# Patient Record
Sex: FEMALE | Race: WHITE | Employment: OTHER | URBAN - METROPOLITAN AREA
[De-identification: names, ages, dates, MRNs, and addresses within clinical notes are randomized per-mention and may not be internally consistent; named-entity substitution may affect disease eponyms.]

---

## 2019-02-04 ENCOUNTER — HOSPITAL ENCOUNTER (INPATIENT)
Facility: CLINIC | Age: 69
LOS: 2 days | Discharge: HOME | DRG: 641 | End: 2019-02-06
Attending: EMERGENCY MEDICINE | Admitting: INTERNAL MEDICINE
Payer: COMMERCIAL

## 2019-02-04 DIAGNOSIS — R19.7 DIARRHEA, UNSPECIFIED TYPE: ICD-10-CM

## 2019-02-04 DIAGNOSIS — E87.1 HYPONATREMIA: ICD-10-CM

## 2019-02-04 DIAGNOSIS — E11.9 TYPE 2 DIABETES MELLITUS WITHOUT COMPLICATION, WITHOUT LONG-TERM CURRENT USE OF INSULIN (H): Primary | ICD-10-CM

## 2019-02-04 LAB
ALBUMIN SERPL-MCNC: 3.5 G/DL (ref 3.4–5)
ALBUMIN UR-MCNC: 30 MG/DL
ALP SERPL-CCNC: 71 U/L (ref 40–150)
ALT SERPL W P-5'-P-CCNC: 37 U/L (ref 0–50)
ANION GAP SERPL CALCULATED.3IONS-SCNC: 11 MMOL/L (ref 3–14)
APPEARANCE UR: CLEAR
AST SERPL W P-5'-P-CCNC: 21 U/L (ref 0–45)
BASOPHILS # BLD AUTO: 0 10E9/L (ref 0–0.2)
BASOPHILS NFR BLD AUTO: 0.2 %
BILIRUB SERPL-MCNC: 2.3 MG/DL (ref 0.2–1.3)
BILIRUB UR QL STRIP: NEGATIVE
BUN SERPL-MCNC: 6 MG/DL (ref 7–30)
CALCIUM SERPL-MCNC: 9.1 MG/DL (ref 8.5–10.1)
CHLORIDE SERPL-SCNC: 86 MMOL/L (ref 94–109)
CO2 SERPL-SCNC: 26 MMOL/L (ref 20–32)
COLOR UR AUTO: YELLOW
CREAT SERPL-MCNC: 0.89 MG/DL (ref 0.52–1.04)
DIFFERENTIAL METHOD BLD: ABNORMAL
EOSINOPHIL # BLD AUTO: 0.2 10E9/L (ref 0–0.7)
EOSINOPHIL NFR BLD AUTO: 1.7 %
ERYTHROCYTE [DISTWIDTH] IN BLOOD BY AUTOMATED COUNT: 12.2 % (ref 10–15)
GFR SERPL CREATININE-BSD FRML MDRD: 66 ML/MIN/{1.73_M2}
GLUCOSE SERPL-MCNC: 129 MG/DL (ref 70–99)
GLUCOSE UR STRIP-MCNC: NEGATIVE MG/DL
HCT VFR BLD AUTO: 40.4 % (ref 35–47)
HGB BLD-MCNC: 15 G/DL (ref 11.7–15.7)
HGB UR QL STRIP: NEGATIVE
HYALINE CASTS #/AREA URNS LPF: 4 /LPF (ref 0–2)
IMM GRANULOCYTES # BLD: 0 10E9/L (ref 0–0.4)
IMM GRANULOCYTES NFR BLD: 0.2 %
KETONES UR STRIP-MCNC: 40 MG/DL
LEUKOCYTE ESTERASE UR QL STRIP: NEGATIVE
LYMPHOCYTES # BLD AUTO: 2.1 10E9/L (ref 0.8–5.3)
LYMPHOCYTES NFR BLD AUTO: 22.1 %
MAGNESIUM SERPL-MCNC: 1.6 MG/DL (ref 1.6–2.3)
MCH RBC QN AUTO: 29.4 PG (ref 26.5–33)
MCHC RBC AUTO-ENTMCNC: 37.1 G/DL (ref 31.5–36.5)
MCV RBC AUTO: 79 FL (ref 78–100)
MONOCYTES # BLD AUTO: 0.7 10E9/L (ref 0–1.3)
MONOCYTES NFR BLD AUTO: 7.1 %
MUCOUS THREADS #/AREA URNS LPF: PRESENT /LPF
NEUTROPHILS # BLD AUTO: 6.4 10E9/L (ref 1.6–8.3)
NEUTROPHILS NFR BLD AUTO: 68.7 %
NITRATE UR QL: NEGATIVE
NRBC # BLD AUTO: 0 10*3/UL
NRBC BLD AUTO-RTO: 0 /100
OSMOLALITY SERPL: 266 MMOL/KG (ref 280–301)
OSMOLALITY UR: 426 MMOL/KG (ref 100–1200)
PH UR STRIP: 6 PH (ref 5–7)
PLATELET # BLD AUTO: 363 10E9/L (ref 150–450)
POTASSIUM SERPL-SCNC: 3.1 MMOL/L (ref 3.4–5.3)
POTASSIUM SERPL-SCNC: 3.2 MMOL/L (ref 3.4–5.3)
PROT SERPL-MCNC: 6.9 G/DL (ref 6.8–8.8)
RBC # BLD AUTO: 5.11 10E12/L (ref 3.8–5.2)
RBC #/AREA URNS AUTO: <1 /HPF (ref 0–2)
SODIUM SERPL-SCNC: 123 MMOL/L (ref 133–144)
SODIUM SERPL-SCNC: 127 MMOL/L (ref 133–144)
SODIUM UR-SCNC: 32 MMOL/L
SOURCE: ABNORMAL
SP GR UR STRIP: 1.01 (ref 1–1.03)
SQUAMOUS #/AREA URNS AUTO: 1 /HPF (ref 0–1)
TROPONIN I SERPL-MCNC: <0.015 UG/L (ref 0–0.04)
UROBILINOGEN UR STRIP-MCNC: NORMAL MG/DL (ref 0–2)
WBC # BLD AUTO: 9.4 10E9/L (ref 4–11)
WBC #/AREA URNS AUTO: 1 /HPF (ref 0–5)

## 2019-02-04 PROCEDURE — 81001 URINALYSIS AUTO W/SCOPE: CPT | Performed by: EMERGENCY MEDICINE

## 2019-02-04 PROCEDURE — 25000132 ZZH RX MED GY IP 250 OP 250 PS 637: Performed by: EMERGENCY MEDICINE

## 2019-02-04 PROCEDURE — 83935 ASSAY OF URINE OSMOLALITY: CPT | Performed by: EMERGENCY MEDICINE

## 2019-02-04 PROCEDURE — 99285 EMERGENCY DEPT VISIT HI MDM: CPT | Mod: 25

## 2019-02-04 PROCEDURE — 83735 ASSAY OF MAGNESIUM: CPT | Performed by: EMERGENCY MEDICINE

## 2019-02-04 PROCEDURE — 80053 COMPREHEN METABOLIC PANEL: CPT | Performed by: EMERGENCY MEDICINE

## 2019-02-04 PROCEDURE — 84295 ASSAY OF SERUM SODIUM: CPT | Performed by: INTERNAL MEDICINE

## 2019-02-04 PROCEDURE — 84484 ASSAY OF TROPONIN QUANT: CPT | Performed by: EMERGENCY MEDICINE

## 2019-02-04 PROCEDURE — 12000000 ZZH R&B MED SURG/OB

## 2019-02-04 PROCEDURE — 25000128 H RX IP 250 OP 636: Performed by: INTERNAL MEDICINE

## 2019-02-04 PROCEDURE — 83930 ASSAY OF BLOOD OSMOLALITY: CPT | Performed by: INTERNAL MEDICINE

## 2019-02-04 PROCEDURE — 84132 ASSAY OF SERUM POTASSIUM: CPT | Performed by: INTERNAL MEDICINE

## 2019-02-04 PROCEDURE — 25000128 H RX IP 250 OP 636: Performed by: EMERGENCY MEDICINE

## 2019-02-04 PROCEDURE — 36415 COLL VENOUS BLD VENIPUNCTURE: CPT | Performed by: INTERNAL MEDICINE

## 2019-02-04 PROCEDURE — 99223 1ST HOSP IP/OBS HIGH 75: CPT | Mod: AI | Performed by: INTERNAL MEDICINE

## 2019-02-04 PROCEDURE — 96360 HYDRATION IV INFUSION INIT: CPT

## 2019-02-04 PROCEDURE — 84300 ASSAY OF URINE SODIUM: CPT | Performed by: EMERGENCY MEDICINE

## 2019-02-04 PROCEDURE — 25000132 ZZH RX MED GY IP 250 OP 250 PS 637: Performed by: INTERNAL MEDICINE

## 2019-02-04 PROCEDURE — 85025 COMPLETE CBC W/AUTO DIFF WBC: CPT | Performed by: EMERGENCY MEDICINE

## 2019-02-04 RX ORDER — POTASSIUM CL/LIDO/0.9 % NACL 10MEQ/0.1L
10 INTRAVENOUS SOLUTION, PIGGYBACK (ML) INTRAVENOUS
Status: DISCONTINUED | OUTPATIENT
Start: 2019-02-04 | End: 2019-02-06 | Stop reason: HOSPADM

## 2019-02-04 RX ORDER — POTASSIUM CHLORIDE 1.5 G/1.58G
20-40 POWDER, FOR SOLUTION ORAL
Status: DISCONTINUED | OUTPATIENT
Start: 2019-02-04 | End: 2019-02-06 | Stop reason: HOSPADM

## 2019-02-04 RX ORDER — SODIUM CHLORIDE 9 MG/ML
INJECTION, SOLUTION INTRAVENOUS CONTINUOUS
Status: ACTIVE | OUTPATIENT
Start: 2019-02-04 | End: 2019-02-05

## 2019-02-04 RX ORDER — LATANOPROST 50 UG/ML
1 SOLUTION/ DROPS OPHTHALMIC AT BEDTIME
COMMUNITY

## 2019-02-04 RX ORDER — MAGNESIUM SULFATE HEPTAHYDRATE 40 MG/ML
4 INJECTION, SOLUTION INTRAVENOUS EVERY 4 HOURS PRN
Status: DISCONTINUED | OUTPATIENT
Start: 2019-02-04 | End: 2019-02-06 | Stop reason: HOSPADM

## 2019-02-04 RX ORDER — NICOTINE POLACRILEX 4 MG
15-30 LOZENGE BUCCAL
Status: DISCONTINUED | OUTPATIENT
Start: 2019-02-04 | End: 2019-02-06 | Stop reason: HOSPADM

## 2019-02-04 RX ORDER — POTASSIUM CHLORIDE 29.8 MG/ML
20 INJECTION INTRAVENOUS
Status: DISCONTINUED | OUTPATIENT
Start: 2019-02-04 | End: 2019-02-06 | Stop reason: HOSPADM

## 2019-02-04 RX ORDER — PROCHLORPERAZINE MALEATE 5 MG
5 TABLET ORAL EVERY 6 HOURS PRN
Status: DISCONTINUED | OUTPATIENT
Start: 2019-02-04 | End: 2019-02-06 | Stop reason: HOSPADM

## 2019-02-04 RX ORDER — ATORVASTATIN CALCIUM 10 MG/1
10 TABLET, FILM COATED ORAL DAILY
COMMUNITY

## 2019-02-04 RX ORDER — DEXTROSE MONOHYDRATE 25 G/50ML
25-50 INJECTION, SOLUTION INTRAVENOUS
Status: DISCONTINUED | OUTPATIENT
Start: 2019-02-04 | End: 2019-02-06 | Stop reason: HOSPADM

## 2019-02-04 RX ORDER — METFORMIN HCL 500 MG
500 TABLET, EXTENDED RELEASE 24 HR ORAL
Status: ON HOLD | COMMUNITY
End: 2019-02-06

## 2019-02-04 RX ORDER — ONDANSETRON 2 MG/ML
4 INJECTION INTRAMUSCULAR; INTRAVENOUS EVERY 6 HOURS PRN
Status: DISCONTINUED | OUTPATIENT
Start: 2019-02-04 | End: 2019-02-06 | Stop reason: HOSPADM

## 2019-02-04 RX ORDER — NAPROXEN SODIUM 220 MG
220 TABLET ORAL 2 TIMES DAILY WITH MEALS
Status: ON HOLD | COMMUNITY
End: 2019-02-06

## 2019-02-04 RX ORDER — NALOXONE HYDROCHLORIDE 0.4 MG/ML
.1-.4 INJECTION, SOLUTION INTRAMUSCULAR; INTRAVENOUS; SUBCUTANEOUS
Status: DISCONTINUED | OUTPATIENT
Start: 2019-02-04 | End: 2019-02-06 | Stop reason: HOSPADM

## 2019-02-04 RX ORDER — POTASSIUM CHLORIDE 7.45 MG/ML
10 INJECTION INTRAVENOUS
Status: DISCONTINUED | OUTPATIENT
Start: 2019-02-04 | End: 2019-02-06 | Stop reason: HOSPADM

## 2019-02-04 RX ORDER — POTASSIUM CHLORIDE 1500 MG/1
40 TABLET, EXTENDED RELEASE ORAL ONCE
Status: COMPLETED | OUTPATIENT
Start: 2019-02-04 | End: 2019-02-04

## 2019-02-04 RX ORDER — POLYETHYLENE GLYCOL 3350 17 G/17G
17 POWDER, FOR SOLUTION ORAL DAILY PRN
Status: DISCONTINUED | OUTPATIENT
Start: 2019-02-04 | End: 2019-02-06 | Stop reason: HOSPADM

## 2019-02-04 RX ORDER — LISINOPRIL AND HYDROCHLOROTHIAZIDE 20; 25 MG/1; MG/1
1 TABLET ORAL DAILY
Status: ON HOLD | COMMUNITY
End: 2019-02-06

## 2019-02-04 RX ORDER — LISINOPRIL 20 MG/1
20 TABLET ORAL DAILY
Status: DISCONTINUED | OUTPATIENT
Start: 2019-02-05 | End: 2019-02-06 | Stop reason: HOSPADM

## 2019-02-04 RX ORDER — ATENOLOL 50 MG/1
50 TABLET ORAL DAILY
Status: DISCONTINUED | OUTPATIENT
Start: 2019-02-04 | End: 2019-02-04 | Stop reason: CLARIF

## 2019-02-04 RX ORDER — PROCHLORPERAZINE 25 MG
12.5 SUPPOSITORY, RECTAL RECTAL EVERY 12 HOURS PRN
Status: DISCONTINUED | OUTPATIENT
Start: 2019-02-04 | End: 2019-02-06 | Stop reason: HOSPADM

## 2019-02-04 RX ORDER — POTASSIUM CHLORIDE 1500 MG/1
20-40 TABLET, EXTENDED RELEASE ORAL
Status: DISCONTINUED | OUTPATIENT
Start: 2019-02-04 | End: 2019-02-06 | Stop reason: HOSPADM

## 2019-02-04 RX ORDER — MULTIVITAMIN,THERAPEUTIC
1 TABLET ORAL DAILY
COMMUNITY

## 2019-02-04 RX ORDER — ONDANSETRON 4 MG/1
4 TABLET, ORALLY DISINTEGRATING ORAL EVERY 8 HOURS PRN
Qty: 10 TABLET | Refills: 0 | Status: SHIPPED | OUTPATIENT
Start: 2019-02-04 | End: 2019-02-04

## 2019-02-04 RX ORDER — LATANOPROST 50 UG/ML
1 SOLUTION/ DROPS OPHTHALMIC AT BEDTIME
Status: DISCONTINUED | OUTPATIENT
Start: 2019-02-04 | End: 2019-02-06 | Stop reason: HOSPADM

## 2019-02-04 RX ORDER — ONDANSETRON 4 MG/1
4 TABLET, ORALLY DISINTEGRATING ORAL EVERY 6 HOURS PRN
Status: DISCONTINUED | OUTPATIENT
Start: 2019-02-04 | End: 2019-02-06 | Stop reason: HOSPADM

## 2019-02-04 RX ADMIN — POTASSIUM CHLORIDE 20 MEQ: 1500 TABLET, EXTENDED RELEASE ORAL at 21:30

## 2019-02-04 RX ADMIN — SODIUM CHLORIDE: 9 INJECTION, SOLUTION INTRAVENOUS at 21:31

## 2019-02-04 RX ADMIN — POTASSIUM CHLORIDE 40 MEQ: 1500 TABLET, EXTENDED RELEASE ORAL at 16:50

## 2019-02-04 RX ADMIN — SODIUM CHLORIDE 1000 ML: 9 INJECTION, SOLUTION INTRAVENOUS at 16:50

## 2019-02-04 ASSESSMENT — ACTIVITIES OF DAILY LIVING (ADL)
FALL_HISTORY_WITHIN_LAST_SIX_MONTHS: NO
BATHING: 0-->INDEPENDENT
SWALLOWING: 0-->SWALLOWS FOODS/LIQUIDS WITHOUT DIFFICULTY
COGNITION: 0 - NO COGNITION ISSUES REPORTED
RETIRED_EATING: 0-->INDEPENDENT
DRESS: 0-->INDEPENDENT
TOILETING: 0-->INDEPENDENT
AMBULATION: 0-->INDEPENDENT
RETIRED_COMMUNICATION: 0-->UNDERSTANDS/COMMUNICATES WITHOUT DIFFICULTY
TRANSFERRING: 0-->INDEPENDENT

## 2019-02-04 ASSESSMENT — MIFFLIN-ST. JEOR: SCORE: 1409.05

## 2019-02-04 ASSESSMENT — ENCOUNTER SYMPTOMS
CONFUSION: 0
CHILLS: 0
ABDOMINAL PAIN: 0
DIARRHEA: 1
FEVER: 0
NAUSEA: 1
BLOOD IN STOOL: 0
VOMITING: 1

## 2019-02-04 NOTE — ED PROVIDER NOTES
History     Chief Complaint:  Diarrhea    HPI   Tessie Dexter is a 68 year old female, with a history of CAD, hypertension, hyperlipidemia, and type 2 diabetes, who presents with her  to the ED for evaluation of diarrhea. The patient reports she was prescribed a Z-Darius a month ago. She developed nonbloody diarrhea over 2 weeks ago. She had up to 5 episodes two days ago and 2 episodes yesterday and today. The stools were initially watery but is becoming more formed. She has been taking Imodium with the last dose yesterday. The patient notes she vomited once over a week ago. She has been feeling intermittently nauseous. She did need to receive fluids 5 days ago. She has been continuing to hydrate with smartwater. The patient reports she visited her PCP at [facility] today and was advised to visit the ED. The patient denies any recent travel, fever, chills, abdominal pain, or urinary symptoms. The  denies confusion.     Laboratory:   CMP: (L), Potassium 3.0(L), Chloride 87(L), Glucose 151(H), BUN 6(L), o/w WNL (Creatinine 0.90) (1/30/19)  Ova/parasite: Negative (1/28/19)  Stool pathogen multiplex PCR: Negative (1/26/19)   C diff PCR: Negative(1/25/19)    Allergies:  Macrobid: rash      Medications:    Metformin  Lisinopril-Hydrochlorothiazide   Lipitor  Multivitamin  Fish oil  Vitamin E  Aspirin 81mg   Atenolol   Vitamin D  Simvastatin   Dyazide    Past Medical History:    Type 2 DM  Glaucoma   HLD  HTN  CAD    Past Surgical History:    Cholecystectomy  D&C    Family History:    Diabetes  Cancer  MI    Social History:  Smoking status: Never smoker    Alcohol use: No  Presents to ED with    Marital Status:  Unknown [6]     Review of Systems   Constitutional: Negative for chills and fever.   Gastrointestinal: Positive for diarrhea, nausea and vomiting. Negative for abdominal pain and blood in stool.   Psychiatric/Behavioral: Negative for confusion.   All other systems reviewed and  are negative.    Physical Exam     Patient Vitals for the past 24 hrs:   BP Temp Temp src Pulse Heart Rate Resp SpO2 Height Weight   02/04/19 2015 128/77 98.4  F (36.9  C) Oral 96 -- 18 96 % -- --   02/04/19 1737 141/83 -- -- 99 -- 18 100 % -- --   02/04/19 1203 131/77 98.8  F (37.1  C) Oral -- 82 20 99 % 1.524 m (5') 95.3 kg (210 lb)     Physical Exam    General: Resting comfortably on the gurney  Eyes:  The pupils are equal and round    Conjunctivae and sclerae are normal  ENT:    Moist mucous membranes  Neck:  Normal range of motion  CV:  Regular rate and rhythm    Skin warm and well perfused   Resp:  Lungs are clear    Non-labored    No rales    No wheezing   GI:  Abdomen is soft, there is no rigidity    No distension    No rebound tenderness     No abdominal tenderness  MS:  Normal muscular tone  Skin:  No rash or acute skin lesions noted  Neuro:   Awake, alert.      No confusion    Speech is normal and fluent.    Face is symmetric.     Moves all extremities equally  Psych: Normal affect.  Appropriate interactions.    Emergency Department Course     Laboratory:  Troponin I (1339): <0.015    CBC: o/w WNL (WBC 9.4, HGB 15.0, )  CMP: (L), Potassium 3.2(L), Chloride 86(L), Glucose 129(H), BUN 6(L), Bilirubin total 2.3(H), o/w WNL (Creatinine 0.89)     UA: Urineketon 40, Protein albumin 30, Mucous present, Hyaline casts 4(H), o/w Negative     Interventions:  1650: NS 1L Bolus IV  1650: Potassium chloride 40mEq PO    Emergency Department Course:  Past medical records, nursing notes, and vitals reviewed.  1623: I performed an exam of the patient and obtained history, as documented above.    IV inserted and blood drawn.    1657: I spoke with Dr. Altamirano, patient's PCP.     1700: I rechecked the patient. Explained plan to patient and .    1804: I spoke to Dr. Abreu of the hospitalist service who accepts the patient for admission.     Findings and plan explained to the Patient and spouse who consents  to admission. Discussed the patient with Dr. Abreu, who will admit the patient to a med bed for further monitoring, evaluation, and treatment.     Impression & Plan      Medical Decision Making:  Tessie Dexter is a 68 year old female who presented to the ED with diarrhea. Seen now several times for diarrhea. No abdominal tenderness on exam so doubt diverticulitis, obstruction, colitis, etc. Stool studies unremarkable last week in clinic. Labs today with worsening hyponatremia. Was Na 125 on 1/30 but today 123. Diarrhea does seem to be slowing down. Hyponatremia likely from diarrhea and volume loss.  Discussed with her PCP over the phone. Will admit for worsening hyponatremia and discussed with hospitalist for admission.    Diagnosis:    ICD-10-CM   1. Diarrhea, unspecified type R19.7   2. Hyponatremia E87.1     Disposition: Patient admitted to a med bed by Dr. Lois Foster  2/4/2019    EMERGENCY DEPARTMENT    Scribe Disclosure:  I, Shana Foster, am serving as a scribe at 4:23 PM on 2/4/2019 to document services personally performed by Sayad Lu MD based on my observations and the provider's statements to me.        Sayda Lu MD  02/05/19 0312

## 2019-02-04 NOTE — ED NOTES
"[facility]  ED Nurse Handoff Report    ED Chief complaint: Diarrhea (x 3 weeks)      ED Diagnosis:   Final diagnoses:   Diarrhea, unspecified type   Hyponatremia       Code Status: Full Code    Allergies:   Allergies   Allergen Reactions     Macrobid [Nitrofurantoin]        Activity level - Baseline/Home:  Independent    Activity Level - Current:   Stand with Assist     Needed?: No    Isolation: Yes  Infection: other, no stool sample collected  Bariatric?: No    Vital Signs:   Vitals:    02/04/19 1203 02/04/19 1737   BP: 131/77 141/83   Pulse:  99   Resp: 20 18   Temp: 98.8  F (37.1  C)    TempSrc: Oral    SpO2: 99% 100%   Weight: 95.3 kg (210 lb)    Height: 1.524 m (5')        Cardiac Rhythm: ,        Pain level: 0-10 Pain Scale: 0    Is this patient confused?: No   Does this patient have a guardian?  No         If yes, is there guardianship documents in the Epic \"Code/ACP\" activity?  N/A         Guardian Notified?  N/A  [place] - Suicide Severity Rating Scale Completed?  Yes  If yes, what color did the patient score?  White    Patient Report: Initial Complaint: Diarrhea x2 weeks   Focused Assessment: Presents to ED today after visiting PCP office today for diarrhea x2 weeks.  These have been non-bloody.  Stools were initially watery but has become more formed over the last few days.  Vomited x1 a week ago but has had on and off nausea.  Reports having fluids 5 days ago.  Has been orally hydrating with Smart Water the last few days.  Was also prescribed a Z-nirav 1 month ago.  Tests Performed: basic blood work and UA  Abnormal Results: see lab reults  Treatments provided: NS 1 L    Family Comments:  left to go home.    OBS brochure/video discussed/provided to patient/family: Yes              Name of person given brochure if not patient: self              Relationship to patient: self    ED Medications:   Medications   potassium chloride ER (K-DUR/KLOR-CON M) CR tablet 40 mEq (40 " mEq Oral Given 2/4/19 1650)   0.9% sodium chloride BOLUS (0 mLs Intravenous Stopped 2/4/19 1739)       Drips infusing?:  No    For the majority of the shift this patient was Green.   Interventions performed were basic cares.    Severe Sepsis OR Septic Shock Diagnosis Present: No    To be done/followed up on inpatient unit:  none    ED NURSE PHONE NUMBER: *[phone]

## 2019-02-05 LAB
ANION GAP SERPL CALCULATED.3IONS-SCNC: 6 MMOL/L (ref 3–14)
BASOPHILS # BLD AUTO: 0 10E9/L (ref 0–0.2)
BASOPHILS NFR BLD AUTO: 0.3 %
BILIRUB DIRECT SERPL-MCNC: 0.4 MG/DL (ref 0–0.2)
BILIRUB SERPL-MCNC: 1.6 MG/DL (ref 0.2–1.3)
BUN SERPL-MCNC: 7 MG/DL (ref 7–30)
C COLI+JEJUNI+LARI FUSA STL QL NAA+PROBE: NOT DETECTED
C DIFF TOX B STL QL: NEGATIVE
CALCIUM SERPL-MCNC: 8.7 MG/DL (ref 8.5–10.1)
CHLORIDE SERPL-SCNC: 101 MMOL/L (ref 94–109)
CO2 SERPL-SCNC: 25 MMOL/L (ref 20–32)
CREAT SERPL-MCNC: 0.86 MG/DL (ref 0.52–1.04)
DIFFERENTIAL METHOD BLD: NORMAL
EC STX1 GENE STL QL NAA+PROBE: NOT DETECTED
EC STX2 GENE STL QL NAA+PROBE: NOT DETECTED
ENTERIC PATHOGEN COMMENT: NORMAL
EOSINOPHIL # BLD AUTO: 0.3 10E9/L (ref 0–0.7)
EOSINOPHIL NFR BLD AUTO: 3.5 %
ERYTHROCYTE [DISTWIDTH] IN BLOOD BY AUTOMATED COUNT: 12.4 % (ref 10–15)
GFR SERPL CREATININE-BSD FRML MDRD: 70 ML/MIN/{1.73_M2}
GLUCOSE BLDC GLUCOMTR-MCNC: 104 MG/DL (ref 70–99)
GLUCOSE BLDC GLUCOMTR-MCNC: 131 MG/DL (ref 70–99)
GLUCOSE BLDC GLUCOMTR-MCNC: 139 MG/DL (ref 70–99)
GLUCOSE BLDC GLUCOMTR-MCNC: 141 MG/DL (ref 70–99)
GLUCOSE BLDC GLUCOMTR-MCNC: 149 MG/DL (ref 70–99)
GLUCOSE BLDC GLUCOMTR-MCNC: 155 MG/DL (ref 70–99)
GLUCOSE SERPL-MCNC: 123 MG/DL (ref 70–99)
HBA1C MFR BLD: 7.3 % (ref 0–5.6)
HCT VFR BLD AUTO: 37.1 % (ref 35–47)
HGB BLD-MCNC: 13.4 G/DL (ref 11.7–15.7)
IMM GRANULOCYTES # BLD: 0 10E9/L (ref 0–0.4)
IMM GRANULOCYTES NFR BLD: 0.3 %
LYMPHOCYTES # BLD AUTO: 2.6 10E9/L (ref 0.8–5.3)
LYMPHOCYTES NFR BLD AUTO: 33.6 %
MCH RBC QN AUTO: 28.7 PG (ref 26.5–33)
MCHC RBC AUTO-ENTMCNC: 36.1 G/DL (ref 31.5–36.5)
MCV RBC AUTO: 79 FL (ref 78–100)
MONOCYTES # BLD AUTO: 0.7 10E9/L (ref 0–1.3)
MONOCYTES NFR BLD AUTO: 9.2 %
NEUTROPHILS # BLD AUTO: 4.1 10E9/L (ref 1.6–8.3)
NEUTROPHILS NFR BLD AUTO: 53.1 %
NOROV GI+II ORF1-ORF2 JNC STL QL NAA+PR: NOT DETECTED
NRBC # BLD AUTO: 0 10*3/UL
NRBC BLD AUTO-RTO: 0 /100
PLATELET # BLD AUTO: 306 10E9/L (ref 150–450)
POTASSIUM SERPL-SCNC: 3.8 MMOL/L (ref 3.4–5.3)
RBC # BLD AUTO: 4.67 10E12/L (ref 3.8–5.2)
RVA NSP5 STL QL NAA+PROBE: NOT DETECTED
SALMONELLA SP RPOD STL QL NAA+PROBE: NOT DETECTED
SHIGELLA SP+EIEC IPAH STL QL NAA+PROBE: NOT DETECTED
SODIUM SERPL-SCNC: 132 MMOL/L (ref 133–144)
SODIUM SERPL-SCNC: 136 MMOL/L (ref 133–144)
SODIUM SERPL-SCNC: 136 MMOL/L (ref 133–144)
SPECIMEN SOURCE: NORMAL
V CHOL+PARA RFBL+TRKH+TNAA STL QL NAA+PR: NOT DETECTED
WBC # BLD AUTO: 7.7 10E9/L (ref 4–11)
Y ENTERO RECN STL QL NAA+PROBE: NOT DETECTED

## 2019-02-05 PROCEDURE — 12000000 ZZH R&B MED SURG/OB

## 2019-02-05 PROCEDURE — 25000128 H RX IP 250 OP 636: Performed by: INTERNAL MEDICINE

## 2019-02-05 PROCEDURE — 85025 COMPLETE CBC W/AUTO DIFF WBC: CPT | Performed by: INTERNAL MEDICINE

## 2019-02-05 PROCEDURE — 83036 HEMOGLOBIN GLYCOSYLATED A1C: CPT | Performed by: INTERNAL MEDICINE

## 2019-02-05 PROCEDURE — 00000146 ZZHCL STATISTIC GLUCOSE BY METER IP

## 2019-02-05 PROCEDURE — 25000132 ZZH RX MED GY IP 250 OP 250 PS 637: Performed by: INTERNAL MEDICINE

## 2019-02-05 PROCEDURE — 99232 SBSQ HOSP IP/OBS MODERATE 35: CPT | Performed by: HOSPITALIST

## 2019-02-05 PROCEDURE — 87493 C DIFF AMPLIFIED PROBE: CPT | Performed by: EMERGENCY MEDICINE

## 2019-02-05 PROCEDURE — 87506 IADNA-DNA/RNA PROBE TQ 6-11: CPT | Performed by: EMERGENCY MEDICINE

## 2019-02-05 PROCEDURE — 84295 ASSAY OF SERUM SODIUM: CPT | Performed by: INTERNAL MEDICINE

## 2019-02-05 PROCEDURE — 82248 BILIRUBIN DIRECT: CPT | Performed by: INTERNAL MEDICINE

## 2019-02-05 PROCEDURE — 36415 COLL VENOUS BLD VENIPUNCTURE: CPT | Performed by: INTERNAL MEDICINE

## 2019-02-05 PROCEDURE — 82247 BILIRUBIN TOTAL: CPT | Performed by: INTERNAL MEDICINE

## 2019-02-05 PROCEDURE — 25000131 ZZH RX MED GY IP 250 OP 636 PS 637: Performed by: INTERNAL MEDICINE

## 2019-02-05 PROCEDURE — 80048 BASIC METABOLIC PNL TOTAL CA: CPT | Performed by: INTERNAL MEDICINE

## 2019-02-05 RX ADMIN — LATANOPROST 1 DROP: 50 SOLUTION OPHTHALMIC at 21:02

## 2019-02-05 RX ADMIN — LISINOPRIL 20 MG: 20 TABLET ORAL at 09:32

## 2019-02-05 RX ADMIN — POTASSIUM CHLORIDE 40 MEQ: 1500 TABLET, EXTENDED RELEASE ORAL at 00:40

## 2019-02-05 RX ADMIN — INSULIN ASPART 1 UNITS: 100 INJECTION, SOLUTION INTRAVENOUS; SUBCUTANEOUS at 13:33

## 2019-02-05 RX ADMIN — INSULIN ASPART 1 UNITS: 100 INJECTION, SOLUTION INTRAVENOUS; SUBCUTANEOUS at 09:33

## 2019-02-05 RX ADMIN — SODIUM CHLORIDE: 9 INJECTION, SOLUTION INTRAVENOUS at 09:38

## 2019-02-05 ASSESSMENT — ACTIVITIES OF DAILY LIVING (ADL)
ADLS_ACUITY_SCORE: 11

## 2019-02-05 ASSESSMENT — MIFFLIN-ST. JEOR: SCORE: 1413.5

## 2019-02-05 NOTE — PROGRESS NOTES
[facility]  Hospitalist Progress Note   02/05/2019          Assessment and Plan:       Ms. Tessie Dexter is a 68-year-old female with a history of diabetes mellitus type 2, hypertension, hyperlipidemia and coronary artery disease admitted on 2/4/2019 with loose stools and diarrhea.    Hyponatremia, suspect hypovolemic/nutritional as well as related to diuretic usage.    Hypokalemia likely from diarrhea/diuretic use.  Having decreased appetite with nausea as well as diarrhea for the past 2 weeks.  She is also on hydrochlorothiazide.   Presented with sodium of 123, potassium 3.2, chloride 86, bicarb 26.  Creatinine 0.89.  Troponin negative, CBC unremarkable, UA did not show signs of infection but did show 40 ketones.   serum osmolality 266, urine sodium 32, urine osmolality 426.  Patient continues to have diarrhea, minimal ambulation and feeling weak.  Sodium improved to 132.  Continue gentle hydration with normal saline at 75 mL/h.  Continue to hold PTA hydrochlorothiazide.    Diarrhea likely viral gastroenteritis.  Ongoing diarrhea for 2 weeks.  No blood.  Evaluation late last month which was negative for C. diff toxin B, stool O and P and with negative stool pathogen panel  C. difficile negative.  Stool for enteric pathogen pending.  Encourage oral probiotic use, gentle hydration as above.    Diabetes mellitus type 2.    Hemoglobin A1c 7.3.  Hold PTA metformin given diarrhea.  High intensity correction scale insulin ordered.  Monitor blood sugars.  Recommend carb controlled diet.    Hyperbilirubinemia.  Bilirubin of 0.3 on admission, check total bilirubin, direct bilirubin levels.  Monitor levels periodically.    Hypertension.  Continue PTA lisinopril.  Hold PTA hydrochlorothiazide.    Hyperlipidemia.  Continue PTA atorvastatin.    Coronary artery disease.  This is on the basis of an equivocal stress test in 2008 with subsequent CT angiogram showing high calcium score, but she was therefore unable to  complete the CT angiogram.  Does not appear to be symptomatic.    Continue PTA aspirin, atorvastatin and lisinopril.    Glaucoma continue PTA eyedrops.    Medically complicated obesity with a BMI of 41.20.  Will need to consider lifestyle modification as able to as outpatient.  Follow-up with PCP.    Arthritis bilateral knees.  PRN Tylenol/warm compress    Physical deconditioning due to acute illness/medically complicated obesity.  Lives at home with her .  Uses a cane or a walker to ambulate around the house.  Lately has been using motorized wheelchair from a family member for ambulation.  Requested patient's RN to have patient ambulate, if any concerns will consider physical therapy assessment in the hospital.  Defer further arthritis workup/eval to PCP.     Orders Placed This Encounter      Low Consistent CHO Diet      DVT Prophylaxis: SCD  Code Status: Full Code  Disposition: Expected discharge tomorrow pending clinical improvement    Patient, interdisciplinary team involved in care and agrees with plan.  Total time  >25 min. More than 50% of time spent in direct patient care, care coordination, patient counseling, and formalizing plan of care.     Juanjose Goodson MD        Interval History:      Patient lying in bed, tolerating clear liquid diet.  Complains of nausea, had one bowel movement overnight, 3 bowel movements since morning, loose watery per patient.No blood in the stools.  Denies any chest pain or shortness of breath.No palpitations.  Mild headache, no dizziness.  No fever or chills.         Physical Exam:        Physical Exam   Temp:  [97.8  F (36.6  C)-98.4  F (36.9  C)] 97.8  F (36.6  C)  Pulse:  [96-99] 96  Heart Rate:  [91] 91  Resp:  [16-18] 16  BP: (128-143)/(77-86) 143/86  SpO2:  [96 %-100 %] 97 %    Intake/Output Summary (Last 24 hours) at 2/5/2019 1331  Last data filed at 2/5/2019 0300  Gross per 24 hour   Intake 100 ml   Output 200 ml   Net -100 ml     Admission Weight: 95.3 kg  (210 lb)  Current Weight: 95.7 kg (210 lb 15.7 oz)    PHYSICAL EXAM  GENERAL: Patient is in no distress. Alert and oriented.  HEART: Regular rate and rhythm. S1S2. No murmurs  LUNGS bilateral slightly decreased breath sounds.  No wheezing no crackles.  ABDOMEN: Soft, no abdominal tenderness, bowel sounds heard   NEURO: No focal weakness, moving all extremities.  EXTREMITIES: Trace pedal edema. 2+ peripheral pulses.  SKIN: Warm, dry. No rash or bruising.  PSYCHIATRY Cooperative       Medications:          insulin aspart  1-3 Units Subcutaneous TID AC     insulin aspart  1-3 Units Subcutaneous At Bedtime     latanoprost  1 drop Both Eyes At Bedtime     lisinopril  20 mg Oral Daily     glucose **OR** dextrose **OR** glucagon, magnesium sulfate, melatonin, naloxone, ondansetron **OR** ondansetron, polyethylene glycol, potassium chloride, potassium chloride with lidocaine, potassium chloride, potassium chloride, potassium chloride, prochlorperazine **OR** prochlorperazine **OR** prochlorperazine         Data:      All new lab and imaging data was reviewed.

## 2019-02-05 NOTE — PLAN OF CARE
A&Ox4, patient has one  watery stool this 12 hour shift. Up to the bathroom with SBA, she calls approprietly. Mod carb diet Skin is intact. She didn't bring home medications. Educated  done with c-diff isolation and handouts give. Monitoring low potassium and sodium. BGM and sliding scale insulin. No insulin needed. She takes metformin at home.

## 2019-02-05 NOTE — PROGRESS NOTES
RECEIVING UNIT ED HANDOFF REVIEW    ED Nurse Handoff Report was reviewed by: Katherine Barry on February 4, 2019 at 7:55 PM

## 2019-02-05 NOTE — H&P
Admitted:     02/04/2019      PRIMARY CARE PHYSICIAN:  Isabelle Altamirano MD.      CHIEF COMPLAINT:  Diarrhea.      HISTORY OF PRESENT ILLNESS:  Ms. Tessie Dexter is a 68-year-old female patient with a history notable including diabetes mellitus type 2, hypertension, coronary artery disease (based on cardiac CT), who presents with the above issues.  The patient has been dealing with loose stools/diarrhea for the past 2 weeks up to 5 times a day, nonbloody.  Over the past few days it has been getting better and she has been only having about 2 per day.  The patient did have low sodium in clinic about 5 days ago down to 125.  She has been drinking Smart Water for hydration.  She has had nausea, but no vomiting.  She has been able keep most food down.  She was seen in clinic today and given concerns of a provider there, she was sent to [facility] for further evaluation.      The patient was evaluated.  Vital signs showed a temperature of 98.8, heart rate 82, blood pressure 131/77, respiration 20, O2 saturation 99%.  Labs showed that her sodium level was down to 123.  Creatinine was normal.  CBC was unremarkable.  Urinalysis did not suggest infection and it did show 40 ketones.  Overall, given these findings, request for admission was made.      The patient denies any chest pain.  No shortness breath.  No abdominal pain or bloody stools.  No fevers or chills.  Of note, her primary clinic apparently did send some stool tests including C. difficile and PCR on the 01/25, which was negative.  Also, stool ova and parasites was also negative.  She had a stool pathogen PCR which was negative.      PAST MEDICAL HISTORY:   1.  Diabetes mellitus type 2.  Hemoglobin A1c 7.0 in 12/2018.   2.  Hypertension.   3.  Hyperlipidemia.   4.  Coronary artery disease.  She had a nuclear stress test 12/2008 which showed evidence of breast attenuation with an area of reversible defect seen in the apical setting without associated wall motion  abnormality and noted a perfusion deficit seen at both rest and stress, but more prominent in stress, but overall, given the breast attenuation could not be conclusive as far as being a true reversible defect.  She went on to have a CT angiogram in 10/2008, which was unable to be completed due to high calcium score of 1844.   5.  Glaucoma.      PAST SURGICAL HISTORY:   1.  Status post cholecystectomy.   2.  Status post D and C.      ALLERGIES:  MACROBID.      HOME MEDICATIONS:  Based on Care Everywhere:   1.  Aspirin 81 mg a day.   2.  Vitamin E.    3.  Omega 3 fatty acids.   4.  Multivitamins.   5.  Xalatan eye drops.   6.  Atorvastatin 10 mg daily.   7.  Lisinopril/hydrochlorothiazide 20/25 mg 1 tablet a day.   8.  Metformin XR 2000 mg a day.      SOCIAL HISTORY:  The patient does not smoke or drink.  She is .  Does not have any children.  Formerly worked in [employer] and now retired.      FAMILY HISTORY:  Reviewed and there is a positive history of heart disease in the patient's father who  of a heart attack at age 45.      REVIEW OF SYSTEMS:  As noted in HPI, otherwise 10-point review of systems negative.      PHYSICAL EXAMINATION:   VITAL SIGNS:  Temperature 98.8, heart rate 99, blood pressure 141/83, respiratory rate 18, O2 saturations 100%.   GENERAL:  This is a 68-year-old female patient sitting in bed.  She is conversant and friendly.  Does have some slight mild delay in her mentation/answers to questions.  Otherwise, she is appropriate.   HEENT:  Pupils equal, round and reactive.  No scleral icterus or conjunctival injection.  Oropharynx reveals no gross erythema or exudate.   NECK:  No bruits, JVD or adenopathy.   HEART:  Regular rhythm without murmurs, rubs or gallops.   LUNGS:  Grossly clear, without crackles or wheezes.   ABDOMEN:  Soft, nontender, nondistended, bowel sounds, no femoral bruits.   EXTREMITIES:  Show trace edema.   NEUROLOGIC:  No gross focal motor deficits.        LABORATORY  DATA:  Sodium 123, potassium 3.2, chloride 86, bicarbonate 26, BUN 6, creatinine 0.89.  LFTs show total bilirubin of 2.3, otherwise normal.  Troponin negative.  CBC was unremarkable.  Urinalysis did not show signs of infection, but did show 40 ketones.        ASSESSMENT AND PLAN:  Ms. Tessie Dexter is a 68-year-old female patient with a history of diabetes mellitus type 2, hypertension, hyperlipidemia and coronary artery disease who presents with 2 weeks of loose stools/diarrhea and found with a sodium of 123.      1.  Hyponatremia, suspect hypovolemic/nutritional as well as related to diuretic usage.  Of note, she has had decreased appetite with nausea as well as diarrhea for the past 2 weeks.  She is also on hydrochlorothiazide.  At this time, will hydrate gently with normal saline 75 mL an hour for 15 hours.  Total correction of about 6 to 8 mEq over the next 24 hours.  Hold prior to admission hydrochlorothiazide.  We will check serum osmolality, urine osmolality and urine sodium levels to try to rule out a component of SIADH (however if sodium high, would not rule in SIADH, as patient on diuretic).  However, most likely simply mostly related to nutritional causes/hypovolemia.    2.  Diarrhea, unclear etiology.  Suspect viral syndrome.  The patient has been dealing with diarrhea for the last 2 weeks.  She did have stool/diarrhea evaluation late last month which was negative for C. diff toxin B, stool O and P and with negative stool pathogen panel.  At this time, she has C. diff toxin B and enteric panel has been ordered again, but do not expect this to be different than the previous.  Order fecal leukocytes.  If stool studies are negative, then did order p.r.n. Imodium, however, this appears to be improving on its own.      3.  Diabetes mellitus type 2.  Her hemoglobin A1c was 7.0 in 12/2018.  Hold prior to admission metformin for now.  Order insulin correction scale.    4.  Hypertension.  Continue prior to  admission lisinopril.  Will hold hydrochlorothiazide.    5.  Hyperlipidemia.  Continue atorvastatin.    6.  Coronary artery disease.  This is on the basis of an equivocal stress test in  with subsequent CT angiogram showing high calcium score, but he was therefore unable to complete the CT angiogram.  Does not appear to be symptomatic.  Hold prior to admission aspirin for now given that she is not eating much.  Continue atorvastatin and lisinopril.      7.  Glaucoma.  Continue eyedrops.    8.  Prophylaxis.  Pneumoboots and ambulation.      CODE STATUS:  Full code.         GIOVANNY EDWARDS JR., MD             D: 2019   T: 2019   MT: ASIA      Name:     PEE COREA   MRN:      [id number]        Account:      [id number]   :      1950        Admitted:     2019                   Document: [id number]       cc: Isabelle Altamirano MD

## 2019-02-05 NOTE — PLAN OF CARE
Patient is A&Ox4. VSS on RA. Up with SBA, gait belt and walker. Mod carb diet well tolerated. C-diff isolation discontinued.  , 155 insulin given per scale. Carb count. Loose stools x2 this shift. IVF infusing at 75ml/hr. Will continue to monitor.

## 2019-02-05 NOTE — PHARMACY-ADMISSION MEDICATION HISTORY
Admission medication history interview status for the 2/4/2019  admission is complete. See EPIC admission navigator for prior to admission medications     Medication history source reliability:Good    Actions taken by pharmacist (provider contacted, etc): Called [facility] to verify metformin dosage.     Additional medication history information not noted on PTA med list :  Patient was not sure if she was taking IR or ER metformin. She said she takes 1 tablet in the morning, 1 in the evening, and 1 at bedtime. Metformin was last filled Nov 2018 and it was Metformin  mg.     Medication reconciliation/reorder completed by provider prior to medication history? Yes    Time spent in this activity: 15 minutes    Prior to Admission medications    Medication Sig Last Dose Taking? Auth Provider   aspirin 81 MG tablet Take 81 mg by mouth 2 times daily  2/3/2019 Yes Reported, Patient   atorvastatin (LIPITOR) 10 MG tablet Take 10 mg by mouth daily 2/3/2019 Yes Unknown, Entered By History   latanoprost (XALATAN) 0.005 % ophthalmic solution Place 1 drop into both eyes At Bedtime 2/3/2019 Yes Unknown, Entered By History   lisinopril-hydrochlorothiazide (PRINZIDE/ZESTORETIC) 20-25 MG tablet Take 1 tablet by mouth daily 2/3/2019 Yes Unknown, Entered By History   metFORMIN (GLUCOPHAGE-XR) 500 MG 24 hr tablet Take 500 mg by mouth 3 times daily (before meals) 2/3/2019 Yes Unknown, Entered By History   multivitamin, therapeutic (THERA-VIT) TABS tablet Take 1 tablet by mouth daily 2/3/2019 Yes Unknown, Entered By History   naproxen sodium (ANAPROX) 220 MG tablet Take 220 mg by mouth 2 times daily (with meals) 2/3/2019 Yes Unknown, Entered By History

## 2019-02-06 VITALS
OXYGEN SATURATION: 97 % | BODY MASS INDEX: 41.36 KG/M2 | DIASTOLIC BLOOD PRESSURE: 95 MMHG | TEMPERATURE: 97.8 F | HEART RATE: 93 BPM | WEIGHT: 210.66 LBS | RESPIRATION RATE: 16 BRPM | SYSTOLIC BLOOD PRESSURE: 146 MMHG | HEIGHT: 60 IN

## 2019-02-06 LAB
ANION GAP SERPL CALCULATED.3IONS-SCNC: 7 MMOL/L (ref 3–14)
BILIRUB SERPL-MCNC: 1.3 MG/DL (ref 0.2–1.3)
BUN SERPL-MCNC: 9 MG/DL (ref 7–30)
CALCIUM SERPL-MCNC: 8.7 MG/DL (ref 8.5–10.1)
CHLORIDE SERPL-SCNC: 106 MMOL/L (ref 94–109)
CK SERPL-CCNC: 128 U/L (ref 30–225)
CO2 SERPL-SCNC: 23 MMOL/L (ref 20–32)
CREAT SERPL-MCNC: 0.89 MG/DL (ref 0.52–1.04)
GFR SERPL CREATININE-BSD FRML MDRD: 66 ML/MIN/{1.73_M2}
GLUCOSE BLDC GLUCOMTR-MCNC: 101 MG/DL (ref 70–99)
GLUCOSE BLDC GLUCOMTR-MCNC: 134 MG/DL (ref 70–99)
GLUCOSE BLDC GLUCOMTR-MCNC: 158 MG/DL (ref 70–99)
GLUCOSE SERPL-MCNC: 123 MG/DL (ref 70–99)
MAGNESIUM SERPL-MCNC: 1.9 MG/DL (ref 1.6–2.3)
POTASSIUM SERPL-SCNC: 3.7 MMOL/L (ref 3.4–5.3)
SODIUM SERPL-SCNC: 136 MMOL/L (ref 133–144)

## 2019-02-06 PROCEDURE — 83735 ASSAY OF MAGNESIUM: CPT | Performed by: HOSPITALIST

## 2019-02-06 PROCEDURE — 99239 HOSP IP/OBS DSCHRG MGMT >30: CPT | Performed by: HOSPITALIST

## 2019-02-06 PROCEDURE — 00000146 ZZHCL STATISTIC GLUCOSE BY METER IP

## 2019-02-06 PROCEDURE — 82247 BILIRUBIN TOTAL: CPT | Performed by: HOSPITALIST

## 2019-02-06 PROCEDURE — 82550 ASSAY OF CK (CPK): CPT | Performed by: HOSPITALIST

## 2019-02-06 PROCEDURE — 84295 ASSAY OF SERUM SODIUM: CPT | Performed by: INTERNAL MEDICINE

## 2019-02-06 PROCEDURE — 25000132 ZZH RX MED GY IP 250 OP 250 PS 637: Performed by: INTERNAL MEDICINE

## 2019-02-06 PROCEDURE — 80048 BASIC METABOLIC PNL TOTAL CA: CPT | Performed by: HOSPITALIST

## 2019-02-06 PROCEDURE — 36415 COLL VENOUS BLD VENIPUNCTURE: CPT | Performed by: HOSPITALIST

## 2019-02-06 RX ORDER — METFORMIN HCL 500 MG
500 TABLET, EXTENDED RELEASE 24 HR ORAL 2 TIMES DAILY WITH MEALS
Qty: 60 TABLET | Refills: 0 | COMMUNITY
Start: 2019-02-06

## 2019-02-06 RX ORDER — LISINOPRIL 20 MG/1
20 TABLET ORAL DAILY
Qty: 30 TABLET | Refills: 0 | Status: SHIPPED | OUTPATIENT
Start: 2019-02-07 | End: 2019-03-09

## 2019-02-06 RX ORDER — FUROSEMIDE 20 MG
20 TABLET ORAL DAILY
Qty: 30 TABLET | Refills: 0 | Status: SHIPPED | OUTPATIENT
Start: 2019-02-06 | End: 2019-03-08

## 2019-02-06 RX ADMIN — INSULIN ASPART 1 UNITS: 100 INJECTION, SOLUTION INTRAVENOUS; SUBCUTANEOUS at 12:47

## 2019-02-06 RX ADMIN — LISINOPRIL 20 MG: 20 TABLET ORAL at 08:13

## 2019-02-06 ASSESSMENT — ACTIVITIES OF DAILY LIVING (ADL)
ADLS_ACUITY_SCORE: 11

## 2019-02-06 ASSESSMENT — MIFFLIN-ST. JEOR: SCORE: 1412.04

## 2019-02-06 NOTE — DISCHARGE SUMMARY
Discharge Summary  Hospitalist    Date of Admission:  2/4/2019  Date of Discharge:  2/6/2019  Discharging Provider: Juanjose Goodson MD    Primary Care Physician   Isabelle Altamirano MD  Primary Care Provider Phone Number: [phone]  Primary Care Provider Fax Number: [phone]    PRINCIPAL DIAGNOSIS  Hyponatremia, suspect hypovolemic/nutritional as well as related to diuretic usage.    Hypokalemia likely from diarrhea/diuretic use.  Diarrhea likely viral gastroenteritis.  Physical deconditioning from acute illness/chronic debility/arthritis.    Past Medical History:   Diagnosis Date     Diabetes (H)      Glaucoma (increased eye pressure)      Hypertension        History of Present Illness   Tessie Dexter is an 68 year old female who presented with diarrhea   Hospital Course     Ms. Tessie Dexter is a 68-year-old female with a history of diabetes mellitus type 2, hypertension, hyperlipidemia and coronary artery disease admitted on 2/4/2019 with loose stools     Hyponatremia, suspect hypovolemic/nutritional/diuretic use    Hypokalemia likely from diarrhea/diuretic use.  Having decreased appetite with nausea as well as diarrhea for the past 2 weeks. PTA on hydrochlorothiazide.   Sodium of 123, potassium 3.2, chloride 86, bicarb 26.  Creatinine 0.89.  Troponin negative, CBC unremarkable, UA did not show signs of infection but did show 40 ketones.   Serum osmolality 266, urine sodium 32, urine osmolality 426.  With supportive care diarrhea improved.  This morning had a formed stool.  Sodium, potassium corrected.  Held hydrochlorothiazide during hospitalization.  Started on 20 mg oral Lasix daily at the time of discharge, Encourage diet rich in potassium  Monitor electrolytes within 1 week of discharge or earlier if symptomatic.    Diarrhea likely viral gastroenteritis.  Ongoing diarrhea for 2 weeks.  No blood.  Evaluation late last month which was negative for C. diff toxin B, stool O and P and with negative stool  pathogen panel  C. difficile negative.  Stool for enteric pathogen negative.  With supportive care diarrhea improving, had one formed stool this morning.  Plan for discharge home with oral probiotic use as needed.    Diabetes mellitus type 2.   Hemoglobin A1c 7.3.  Held PTA metformin given diarrhea.  Was on high intensity correction scale insulin during hospitalization.  Recommended carbohydrate controlled diet.  At the time of discharge decrease PTA metformin from 500 mg 3 times daily to twice daily.  Monitor daily blood sugars, review on provider visit and optimize hypoglycemic therapy.    Hyperbilirubinemia.  Bilirubin of 2.3 on admission, improved to 1.3 with supportive care.  Asymptomatic.  Monitor periodically or if symptomatic.     Hypertension.  Continue PTA lisinopril.   Discontinued PTA hydrochlorothiazide.  Started on 20 mg oral Lasix daily     Hyperlipidemia  Continue PTA atorvastatin.     Coronary artery disease.    This is on the basis of an equivocal stress test in 2008 with subsequent CT angiogram showing high calcium score, but she was therefore unable to complete the CT angiogram.  Does not appear to be symptomatic.    Continue PTA aspirin, atorvastatin and lisinopril.     Glaucoma   Continue PTA eyedrops.     Medically complicated obesity with a BMI of 41.20.  Will need to consider lifestyle modification as able to as outpatient.  Follow-up with PCP.     Arthritis bilateral knees.  PRN Tylenol/warm compress  Consider orthopedic referral as outpatient.     Physical deconditioning due to acute illness/medically complicated obesity.  Lives at home with her .  Uses a cane or a walker to ambulate around the house.    Lately has been using motorized wheelchair from a family member for ambulation   Has been ambulating independently, discussed with patient's  and no acute need at this time.  Consider lifestyle modification, follow-up with orthopedic team as able to as outpatient.    Patient,  , interdisciplinary team involved in care and agree with plan     Juanjose Goodson MD    Pending Results   Unresulted Labs Ordered in the Past 30 Days of this Admission     No orders found from 12/6/2018 to 2/5/2019.             Physical Exam   Vitals:    02/04/19 1203 02/05/19 0545 02/06/19 0617   Weight: 95.3 kg (210 lb) 95.7 kg (210 lb 15.7 oz) 95.6 kg (210 lb 10.5 oz)     Vital Signs with Ranges  Temp:  [97.5  F (36.4  C)-98.4  F (36.9  C)] 97.8  F (36.6  C)  Pulse:  [93] 93  Heart Rate:  [83-92] 92  Resp:  [16] 16  BP: (102-146)/(61-95) 146/95  SpO2:  [94 %-97 %] 97 %  No intake/output data recorded.  PHYSICAL EXAM  GENERAL: Patient is in no distress. Alert and oriented.  HEART: Regular rate and rhythm. S1S2. No murmurs  LUNGS bilateral slightly decreased breath sounds.  No wheezing no crackles.  ABDOMEN: Soft, no abdominal tenderness, bowel sounds heard   NEURO: No focal weakness, moving all extremities.  EXTREMITIES: Trace pedal edema. 2+ peripheral pulses.  SKIN: Warm, dry. No rash or bruising.    )Consultations This Hospital Stay   None    Time Spent on this Encounter   I, Juanjose Goodson, personally saw the patient today and spent greater than 30 minutes discharging this patient. More than 60% of time spent in direct patient care, care coordination, patient/caregiver counseling, and formalizing plan of care.    Discharge Orders      Reason for your hospital stay    You were admitted to the hospital with diarrhea, low sodium and weakness.     Follow-up and recommended labs and tests     Follow up with primary care provider, Isabelle Altamirano MD, within 7 days for hospital follow- up.  The following labs/tests are recommended:   Monitor BMP in 1 week or earlier if symptomatic.  Age appropriate health maintenance on PCP visit.  Consider orthopedic evaluation for arthritis of bilateral lower extremities as outpatient..     Monitor and record    Monitor daily blood pressure and review on provider  visit.  Monitor blood sugar at least once a day, review on provider visit for optimization of hypoglycemic therapy.     Discharge Instructions    Consider diet rich in potassium, probiotics.  Need to consider aggressive lifestyle modification with diet and exercise.     Activity    Your activity upon discharge: activity as tolerated and no driving for today     When to contact your care team    Call your primary doctor if you have any of the following: Diarrhea or abdominal pain.     Diet    Low-salt, low-fat, moderate carb diet.       Discharge Medications   Current Discharge Medication List      START taking these medications    Details   furosemide (LASIX) 20 MG tablet Take 1 tablet (20 mg) by mouth daily TAKE WITH POTASSIUM RICH DIET  Qty: 30 tablet, Refills: 0    Associated Diagnoses: Type 2 diabetes mellitus without complication, without long-term current use of insulin (H)      lisinopril (PRINIVIL/ZESTRIL) 20 MG tablet Take 1 tablet (20 mg) by mouth daily  Qty: 30 tablet, Refills: 0    Associated Diagnoses: Type 2 diabetes mellitus without complication, without long-term current use of insulin (H)         CONTINUE these medications which have CHANGED    Details   metFORMIN (GLUCOPHAGE-XR) 500 MG 24 hr tablet Take 1 tablet (500 mg) by mouth 2 times daily (with meals)  Qty: 60 tablet, Refills: 0         CONTINUE these medications which have NOT CHANGED    Details   aspirin 81 MG tablet Take 81 mg by mouth 2 times daily       atorvastatin (LIPITOR) 10 MG tablet Take 10 mg by mouth daily      latanoprost (XALATAN) 0.005 % ophthalmic solution Place 1 drop into both eyes At Bedtime      multivitamin, therapeutic (THERA-VIT) TABS tablet Take 1 tablet by mouth daily         STOP taking these medications       lisinopril-hydrochlorothiazide (PRINZIDE/ZESTORETIC) 20-25 MG tablet Comments:   Reason for Stopping:         naproxen sodium (ANAPROX) 220 MG tablet Comments:   Reason for Stopping:             Allergies    Allergies   Allergen Reactions     Macrobid [Nitrofurantoin]        Discharge Disposition   Discharged to home  Condition at discharge: Stable    DATA  Most Recent 3 CBC's:  Recent Labs   Lab Test 02/05/19  0652 02/04/19  1339   WBC 7.7 9.4   HGB 13.4 15.0   MCV 79 79    363      Most Recent 3 BMP's:  Recent Labs   Lab Test 02/06/19  0730 02/05/19  2225 02/05/19  1457 02/05/19  0652 02/04/19  2310 02/04/19  1339    136 136 132* 127* 123*   POTASSIUM 3.7  --   --  3.8 3.1* 3.2*   CHLORIDE 106  --   --  101  --  86*   CO2 23  --   --  25  --  26   BUN 9  --   --  7  --  6*   CR 0.89  --   --  0.86  --  0.89   ANIONGAP 7  --   --  6  --  11   AMY 8.7  --   --  8.7  --  9.1   *  --   --  123*  --  129*     Most Recent 2 LFT's:  Recent Labs   Lab Test 02/06/19  0730 02/05/19  0652 02/04/19  1339   AST  --   --  21   ALT  --   --  37   ALKPHOS  --   --  71   BILITOTAL 1.3 1.6* 2.3*     Most Recent 3 Troponin's:  Recent Labs   Lab Test 02/04/19  1339   TROPI <0.015     Recent Labs   Lab Test 02/05/19  0652   A1C 7.3*

## 2019-02-06 NOTE — PROGRESS NOTES
A/Ox4. VSS. RA. SBA. No SOB, chest pain, N/V. . CHO diet. Na 136. K 3.8. Mg 1.6. Gentle hydration. Slept throughout night. Discharge today if clinically improved.

## 2019-02-06 NOTE — PLAN OF CARE
A&Ox4. CMS intact. Bowel sounds audible. VSS. Tolerating mod cho diet, gentle hydration encouraged. Up with SBA. Denies pain or SOB. Plan discharge home this afternoon.  Pt given discharge instructions. Questions answered. Discharge medications given and reviewed with patient. Follow up appointment encouraged, pt aware. Pt to DC home with spouse with all personal belongings at time of discharge.

## 2019-02-06 NOTE — PLAN OF CARE
Pt A/O#4, VSS on RA, Na-133/136 tonight-trending up, denies pain, up w/SBA to BR, good po, voiding w/o difficulties, IVF to SL to 2100, Bg 139/131 this shift, no BM tonight, possible discharge to home tomorrow.

## 2019-02-06 NOTE — CONSULTS
Care Transition Initial Assessment - RN  Met with: Patient.  DATA   Active Problems:    Hyponatremia     Cognitive Status: alert and oriented.   Contact information and PCP information verified: Yes  Lives With: spouse   Living Arrangements: house  Insurance concerns: No Insurance issues identified  ASSESSMENT  Patient currently receives the following services:  None     Identified issues/concerns regarding health management: Met with patient to discuss discharge plans. Patient stating she is feeling better and anxious to be discharged. However feels a little weak. Offered home care options and patient declined. Offered to make follow up appointment and patient states due to scheduling conflict she would like to make the appointment when she goes home. Discussed the importance of follow up for lab monitoring.    PLAN  Financial costs for the patient include none.  Patient given options and choices for discharge yes .  Patient/family is agreeable to the plan?  Yes:   Patient anticipates discharging to home with spouse .   Care  ([facility]) will continue to follow as needed.
